# Patient Record
Sex: MALE | Race: WHITE | Employment: FULL TIME | URBAN - METROPOLITAN AREA
[De-identification: names, ages, dates, MRNs, and addresses within clinical notes are randomized per-mention and may not be internally consistent; named-entity substitution may affect disease eponyms.]

---

## 2017-09-25 ENCOUNTER — OFFICE VISIT (OUTPATIENT)
Dept: CARDIOLOGY CLINIC | Age: 37
End: 2017-09-25

## 2017-09-25 VITALS
HEIGHT: 73 IN | BODY MASS INDEX: 29.29 KG/M2 | HEART RATE: 72 BPM | SYSTOLIC BLOOD PRESSURE: 126 MMHG | DIASTOLIC BLOOD PRESSURE: 90 MMHG | WEIGHT: 221 LBS

## 2017-09-25 DIAGNOSIS — R00.2 PALPITATIONS: ICD-10-CM

## 2017-09-25 DIAGNOSIS — R94.31 ABNORMAL FINDING ON EKG: ICD-10-CM

## 2017-09-25 DIAGNOSIS — R07.9 CHEST PAIN AT REST: Primary | ICD-10-CM

## 2017-09-25 RX ORDER — ALPRAZOLAM 1 MG/1
TABLET ORAL
COMMUNITY

## 2017-09-25 NOTE — MR AVS SNAPSHOT
Visit Information Date & Time Provider Department Dept. Phone Encounter #  
 9/25/2017  1:40 PM Dian Joyner MD [facility] [phone] [id number] Your Appointments 10/3/2017  1:00 PM  
STRESS ECHOCARDIOGRAMS with SILVERIO ALLEN [facility] ([facility] SCHEDULING) Appt Note: stress echo dx: cp, abn ekg per Guadlupe Handy [address] [place]  
[address] [facility]  
  
    
 10/3/2017  1:00 PM  
STRESS ECHOCARDIOGRAMS with Dominick Muse [facility] ([facility] SCHEDULING) Appt Note: stress echo dx: cp, abn ekg per Guadlupe Handy [address] [place]  
[address] [id number] Upcoming Health Maintenance Date Due DTaP/Tdap/Td series (1 - Tdap) 10/6/2000 INFLUENZA AGE 9 TO ADULT 8/1/2017 Allergies as of 9/25/2017  Review Complete On: 9/25/2017 By: Dian Joyner MD  
 No Known Allergies Current Immunizations  Never Reviewed No immunizations on file. Not reviewed this visit You Were Diagnosed With   
  
 Codes Comments Chest pain at rest    -  Primary ICD-10-CM: R07.9 ICD-9-CM: 786.50 Palpitations     ICD-10-CM: R00.2 ICD-9-CM: 785.1 Abnormal finding on EKG     ICD-10-CM: R94.31 
ICD-9-CM: 794.31 Vitals BP Pulse Height(growth percentile) Weight(growth percentile) BMI Smoking Status 126/90 72 6' 1\" (1.854 m) 221 lb (100.2 kg) 29.16 kg/m2 Never Smoker Vitals History BMI and BSA Data Body Mass Index Body Surface Area  
 29.16 kg/m 2 2.27 m 2 Your Updated Medication List  
  
   
This list is accurate as of: 9/25/17  1:56 PM.  Always use your most recent med list.  
  
  
  
  
 cyclobenzaprine 10 mg tablet Commonly known as:  FLEXERIL  
 Take 1 Tab by mouth three (3) times daily as needed for Muscle Spasm(s). EFFEXOR  mg capsule Generic drug:  venlafaxine-SR Take 150 mg by mouth daily. LORazepam 0.5 mg tablet Commonly known as:  ATIVAN Take 0.5 mg by mouth. XANAX 1 mg tablet Generic drug:  ALPRAZolam  
Take  by mouth nightly as needed for Anxiety. We Performed the Following AMB POC EKG ROUTINE W/ 12 LEADS, INTER & REP [01984 CPT(R)] To-Do List   
 09/25/2017 ECHO:  ECHO TTE STRESS EXRCSE COMP W OR WO CONTR Introducing [facility]! Arely Morris introduces FastSpring patient portal. Now you can access parts of your medical record, email your doctor's office, and request medication refills online. 1. In your internet browser, go to [email or web address] 2. Click on the First Time User? Click Here link in the Sign In box. You will see the New Member Sign Up page. 3. Enter your FastSpring Access Code exactly as it appears below. You will not need to use this code after youve completed the sign-up process. If you do not sign up before the expiration date, you must request a new code. · FastSpring Access Code: [facility] Expires: 12/24/2017  1:41 PM 
 
4. Enter the last four digits of your Social Security Number (xxxx) and Date of Birth (mm/dd/yyyy) as indicated and click Submit. You will be taken to the next sign-up page. 5. Create a FastSpring ID. This will be your FastSpring login ID and cannot be changed, so think of one that is secure and easy to remember. 6. Create a FastSpring password. You can change your password at any time. 7. Enter your Password Reset Question and Answer. This can be used at a later time if you forget your password. 8. Enter your e-mail address. You will receive e-mail notification when new information is available in [address] 9. Click Sign Up. You can now view and download portions of your medical record. 10. Click the Download Summary menu link to download a portable copy of your medical information. If you have questions, please visit the Frequently Asked Questions section of the [employer] website. Remember, [employer] is NOT to be used for urgent needs. For medical emergencies, dial 911. Now available from your iPhone and Android! Please provide this summary of care documentation to your next provider. Your primary care clinician is listed as NONE. If you have any questions after today's visit, please call [phone].

## 2017-09-25 NOTE — PROGRESS NOTES
HISTORY OF PRESENT ILLNESS  Abram Olszewski is a 39 y.o. male. He is seen for evaluation of chest pains. He has been having left-sided chest pains for approximately one year. They radiate down his left arm. He has a family history of heart disease, including his father and grandfather, and therefore, he is concerned. The pains last only about ten seconds and do not stop him from doing whatever he is doing at the time. His cholesterol has been somewhat high in the past, but he is now eating better. He drinks alcohol on occasion, but does not smoke cigarettes. He works in information technology. He does try to exercise about twice per week and does not notice that the pain is definitely exertional.  His EKG shows some T wave inversions in the inferior leads and is read by the computer as possible prior anterior wall infarction due to poor R wave progression. HPI  Patient Active Problem List   Diagnosis Code    Palpitations R00.2    Chest pain at rest R07.9    Abnormal finding on EKG R94.31     Current Outpatient Prescriptions   Medication Sig Dispense Refill    ALPRAZolam (XANAX) 1 mg tablet Take  by mouth nightly as needed for Anxiety.  venlafaxine-SR (EFFEXOR XR) 150 mg capsule Take 150 mg by mouth daily.  LORazepam (ATIVAN) 0.5 mg tablet Take 0.5 mg by mouth.  cyclobenzaprine (FLEXERIL) 10 mg tablet Take 1 Tab by mouth three (3) times daily as needed for Muscle Spasm(s). 20 Tab 0     Past Medical History:   Diagnosis Date    Asthma      Past Surgical History:   Procedure Laterality Date    HX SEPTOPLASTY         Review of Systems   Cardiovascular: Positive for chest pain. All other systems reviewed and are negative. Visit Vitals    /90    Pulse 72    Ht 6' 1\" (1.854 m)    Wt 221 lb (100.2 kg)    BMI 29.16 kg/m2       Physical Exam   Constitutional: He is oriented to person, place, and time. He appears well-nourished. HENT:   Head: Atraumatic.    Eyes: Conjunctivae are normal.   Neck: Neck supple. Cardiovascular: Normal rate, regular rhythm and normal heart sounds. Exam reveals no gallop and no friction rub. No murmur heard. Pulmonary/Chest: Breath sounds normal. He has no wheezes. Abdominal: Bowel sounds are normal.   Musculoskeletal: He exhibits no edema. Neurological: He is oriented to person, place, and time. Skin: Skin is dry. Nursing note and vitals reviewed. ASSESSMENT and PLAN  His chest pain is somewhat atypical, but he is very worried about it. His EKG is not normal.  He does not smoke or have diabetes. I believe that he should undergo stress testing coupled with echocardiography to make sure that he does not have ischemia. We will arrange for this to be done in the office and call him regarding the results.

## 2017-12-08 ENCOUNTER — TELEPHONE (OUTPATIENT)
Dept: CARDIOLOGY CLINIC | Age: 37
End: 2017-12-08

## 2017-12-08 NOTE — TELEPHONE ENCOUNTER
Have not received order. Appointment will need to be made for patient to see Dr. Murali Dumont in the office. Called office # below and gave the nurse a message that appointment would need to be made to see Dr. Murali Dumont.

## 2017-12-08 NOTE — TELEPHONE ENCOUNTER
radha with dr Avila Cuff office called to schedule a tilt table test. Will fax over order   Phone: [phone]

## 2018-01-09 ENCOUNTER — HOSPITAL ENCOUNTER (EMERGENCY)
Age: 38
Discharge: HOME | End: 2018-01-09
Attending: EMERGENCY MEDICINE
Payer: SELF-PAY

## 2018-01-09 VITALS
OXYGEN SATURATION: 96 % | TEMPERATURE: 97.9 F | HEART RATE: 94 BPM | SYSTOLIC BLOOD PRESSURE: 133 MMHG | RESPIRATION RATE: 18 BRPM | BODY MASS INDEX: 26.37 KG/M2 | DIASTOLIC BLOOD PRESSURE: 97 MMHG | HEIGHT: 73 IN | WEIGHT: 199 LBS

## 2018-01-09 DIAGNOSIS — G90.A POSTURAL ORTHOSTATIC TACHYCARDIA SYNDROME: Primary | ICD-10-CM

## 2018-01-09 DIAGNOSIS — R79.89 ELEVATED TSH: ICD-10-CM

## 2018-01-09 DIAGNOSIS — K59.00 CONSTIPATION, UNSPECIFIED CONSTIPATION TYPE: ICD-10-CM

## 2018-01-09 LAB
ALBUMIN SERPL-MCNC: 4.6 G/DL (ref 3.5–5)
ALBUMIN/GLOB SERPL: 1.4 {RATIO} (ref 1.1–2.2)
ALP SERPL-CCNC: 74 U/L (ref 45–117)
ALT SERPL-CCNC: 41 U/L (ref 12–78)
ANION GAP SERPL CALC-SCNC: 12 MMOL/L (ref 5–15)
AST SERPL-CCNC: 16 U/L (ref 15–37)
ATRIAL RATE: 83 BPM
BASOPHILS # BLD: 0 K/UL (ref 0–0.1)
BASOPHILS NFR BLD: 0 % (ref 0–1)
BILIRUB SERPL-MCNC: 0.5 MG/DL (ref 0.2–1)
BUN SERPL-MCNC: 13 MG/DL (ref 6–20)
BUN/CREAT SERPL: 11 (ref 12–20)
CALCIUM SERPL-MCNC: 10.2 MG/DL (ref 8.5–10.1)
CALCULATED P AXIS, ECG09: 19 DEGREES
CALCULATED R AXIS, ECG10: -23 DEGREES
CALCULATED T AXIS, ECG11: 4 DEGREES
CHLORIDE SERPL-SCNC: 105 MMOL/L (ref 97–108)
CO2 SERPL-SCNC: 26 MMOL/L (ref 21–32)
CREAT SERPL-MCNC: 1.14 MG/DL (ref 0.7–1.3)
DIAGNOSIS, 93000: NORMAL
EOSINOPHIL # BLD: 0.1 K/UL (ref 0–0.4)
EOSINOPHIL NFR BLD: 1 % (ref 0–7)
ERYTHROCYTE [DISTWIDTH] IN BLOOD BY AUTOMATED COUNT: 12.1 % (ref 11.5–14.5)
GLOBULIN SER CALC-MCNC: 3.3 G/DL (ref 2–4)
GLUCOSE SERPL-MCNC: 106 MG/DL (ref 65–100)
HCT VFR BLD AUTO: 43.8 % (ref 36.6–50.3)
HGB BLD-MCNC: 15.7 G/DL (ref 12.1–17)
LYMPHOCYTES # BLD: 1.5 K/UL (ref 0.8–3.5)
LYMPHOCYTES NFR BLD: 15 % (ref 12–49)
MCH RBC QN AUTO: 29.8 PG (ref 26–34)
MCHC RBC AUTO-ENTMCNC: 35.8 G/DL (ref 30–36.5)
MCV RBC AUTO: 83.3 FL (ref 80–99)
MONOCYTES # BLD: 0.7 K/UL (ref 0–1)
MONOCYTES NFR BLD: 7 % (ref 5–13)
NEUTS SEG # BLD: 7.7 K/UL (ref 1.8–8)
NEUTS SEG NFR BLD: 77 % (ref 32–75)
P-R INTERVAL, ECG05: 128 MS
PLATELET # BLD AUTO: 297 K/UL (ref 150–400)
POTASSIUM SERPL-SCNC: 3.8 MMOL/L (ref 3.5–5.1)
PROT SERPL-MCNC: 7.9 G/DL (ref 6.4–8.2)
Q-T INTERVAL, ECG07: 382 MS
QRS DURATION, ECG06: 98 MS
QTC CALCULATION (BEZET), ECG08: 448 MS
RBC # BLD AUTO: 5.26 M/UL (ref 4.1–5.7)
SODIUM SERPL-SCNC: 143 MMOL/L (ref 136–145)
TROPONIN I SERPL-MCNC: <0.04 NG/ML
TSH SERPL DL<=0.05 MIU/L-ACNC: 3.35 UIU/ML (ref 0.36–3.74)
VENTRICULAR RATE, ECG03: 83 BPM
WBC # BLD AUTO: 9.9 K/UL (ref 4.1–11.1)

## 2018-01-09 PROCEDURE — 74011250636 HC RX REV CODE- 250/636: Performed by: EMERGENCY MEDICINE

## 2018-01-09 PROCEDURE — 36415 COLL VENOUS BLD VENIPUNCTURE: CPT | Performed by: EMERGENCY MEDICINE

## 2018-01-09 PROCEDURE — 84484 ASSAY OF TROPONIN QUANT: CPT | Performed by: EMERGENCY MEDICINE

## 2018-01-09 PROCEDURE — 99284 EMERGENCY DEPT VISIT MOD MDM: CPT

## 2018-01-09 PROCEDURE — 93005 ELECTROCARDIOGRAM TRACING: CPT

## 2018-01-09 PROCEDURE — 80053 COMPREHEN METABOLIC PANEL: CPT | Performed by: EMERGENCY MEDICINE

## 2018-01-09 PROCEDURE — 96361 HYDRATE IV INFUSION ADD-ON: CPT

## 2018-01-09 PROCEDURE — 84443 ASSAY THYROID STIM HORMONE: CPT | Performed by: EMERGENCY MEDICINE

## 2018-01-09 PROCEDURE — 96360 HYDRATION IV INFUSION INIT: CPT

## 2018-01-09 PROCEDURE — 74011250636 HC RX REV CODE- 250/636: Performed by: PHYSICIAN ASSISTANT

## 2018-01-09 PROCEDURE — 85025 COMPLETE CBC W/AUTO DIFF WBC: CPT | Performed by: EMERGENCY MEDICINE

## 2018-01-09 RX ADMIN — SODIUM CHLORIDE 1000 ML: 9 INJECTION, SOLUTION INTRAVENOUS at 01:00

## 2018-01-09 RX ADMIN — SODIUM CHLORIDE 1000 ML: 900 INJECTION, SOLUTION INTRAVENOUS at 03:00

## 2018-01-09 RX ADMIN — SODIUM CHLORIDE 1000 ML: 9 INJECTION, SOLUTION INTRAVENOUS at 01:33

## 2018-01-09 NOTE — DISCHARGE INSTRUCTIONS
Constipation: Care Instructions  Your Care Instructions    Constipation means that you have a hard time passing stools (bowel movements). People pass stools from 3 times a day to once every 3 days. What is normal for you may be different. Constipation may occur with pain in the rectum and cramping. The pain may get worse when you try to pass stools. Sometimes there are small amounts of bright red blood on toilet paper or the surface of stools. This is because of enlarged veins near the rectum (hemorrhoids). A few changes in your diet and lifestyle may help you avoid ongoing constipation. Your doctor may also prescribe medicine to help loosen your stool. Some medicines can cause constipation. These include pain medicines and antidepressants. Tell your doctor about all the medicines you take. Your doctor may want to make a medicine change to ease your symptoms. Follow-up care is a key part of your treatment and safety. Be sure to make and go to all appointments, and call your doctor if you are having problems. It's also a good idea to know your test results and keep a list of the medicines you take. How can you care for yourself at home? · Drink plenty of fluids, enough so that your urine is light yellow or clear like water. If you have kidney, heart, or liver disease and have to limit fluids, talk with your doctor before you increase the amount of fluids you drink. · Include high-fiber foods in your diet each day. These include fruits, vegetables, beans, and whole grains. · Get at least 30 minutes of exercise on most days of the week. Walking is a good choice. You also may want to do other activities, such as running, swimming, cycling, or playing tennis or team sports. · Take a fiber supplement, such as Citrucel or Metamucil, every day. Read and follow all instructions on the label. · Schedule time each day for a bowel movement. A daily routine may help.  Take your time having your bowel movement. · Support your feet with a small step stool when you sit on the toilet. This helps flex your hips and places your pelvis in a squatting position. · Your doctor may recommend an over-the-counter laxative to relieve your constipation. Examples are Milk of Magnesia and MiraLax. Read and follow all instructions on the label. Do not use laxatives on a long-term basis. When should you call for help? Call your doctor now or seek immediate medical care if:  ? · You have new or worse belly pain. ? · You have new or worse nausea or vomiting. ? · You have blood in your stools. ? Watch closely for changes in your health, and be sure to contact your doctor if:  ? · Your constipation is getting worse. ? · You do not get better as expected. Where can you learn more? Go to http://ileana-diomedes.info/. Enter 21 [phone] in the search box to learn more about \"Constipation: Care Instructions. \"  Current as of: March 20, 2017  Content Version: 11.4  © 5159-1841 [employer]. Care instructions adapted under license by [employer] (which disclaims liability or warranty for this information). If you have questions about a medical condition or this instruction, always ask your healthcare professional. [address] any warranty or liability for your use of this information. Orthostatic Hypotension: Care Instructions  Your Care Instructions    Orthostatic hypotension is a quick drop in blood pressure. It happens when you get up from sitting or lying down. You may feel faint, lightheaded, or dizzy. When a person sits up or stands up, the body changes the way it pumps blood. This can slow the flow of blood to the brain for a very short time. And that can make you feel lightheaded. Many medicines can cause this problem, especially in older people. Lack of fluids (dehydration) or illnesses such as diabetes or heart disease also can cause it.   Follow-up care is a key part of your treatment and safety. Be sure to make and go to all appointments, and call your doctor if you are having problems. It's also a good idea to know your test results and keep a list of the medicines you take. How can you care for yourself at home? · Tell your doctor about any problems you have with your medicines. · If your doctor prescribes medicine to help prevent a low blood pressure problem, take it exactly as prescribed. Call your doctor if you think you are having a problem with your medicine. · Drink plenty of fluids, enough so that your urine is light yellow or clear like water. Choose water and other caffeine-free clear liquids. If you have kidney, heart, or liver disease and have to limit fluids, talk with your doctor before you increase the amount of fluids you drink. · Limit or avoid alcohol and caffeine. · Get up slowly from bed or after sitting for a long time. If you are in bed, roll to your side and swing your legs over the edge of the bed and onto the floor. Push your body up to a sitting position. Wait for a while before you slowly stand up. If you are dizzy or lightheaded, sit or lie down. When should you call for help? Call 911 anytime you think you may need emergency care. For example, call if:  ? · You passed out (lost consciousness). ? Watch closely for changes in your health, and be sure to contact your doctor if:  ? · You do not get better as expected. Where can you learn more? Go to http://ileana-diomedes.info/. Enter U349 in the search box to learn more about \"Orthostatic Hypotension: Care Instructions. \"  Current as of: September 21, 2016  Content Version: 11.4  © 5964-0877 [employer]. Care instructions adapted under license by [employer] (which disclaims liability or warranty for this information).  If you have questions about a medical condition or this instruction, always ask your healthcare professional. [employer] disclaims any warranty or liability for your use of this information. We hope that we have addressed all of your medical concerns. The examination and treatment you received in the Emergency Department were for an emergent problem and were not intended as complete care. It is important that you follow up with your healthcare provider(s) for ongoing care. If your symptoms worsen or do not improve as expected, and you are unable to reach your usual health care provider(s), you should return to the Emergency Department. Today's healthcare is undergoing tremendous change, and patient satisfaction surveys are one of the many tools to assess the quality of medical care. You may receive a survey from the [employer] regarding your experience in the Emergency Department. I hope that your experience has been completely positive, particularly the medical care that I provided. As such, please participate in the survey; anything less than excellent does not meet my expectations or intentions. [facility] and [address] [place] [address] participate in nationally recognized quality of care measures. If your blood pressure is greater than 120/80, as reported below, we urge that you seek medical care to address the potential of high blood pressure, commonly known as hypertension. Hypertension can be hereditary or can be caused by certain medical conditions, pain, stress, or \"white coat syndrome. \"       Please make an appointment with your health care provider(s) for follow up of your Emergency Department visit. VITALS:   Patient Vitals for the past 8 hrs:   Temp Pulse Resp BP SpO2   01/09/18 0146 - - - 131/76 -   01/09/18 0145 - - - - 98 %   01/09/18 0034 97.9 °F (36.6 °C) 94 18 (!) 127/98 96 %          Thank you for allowing us to provide you with medical care today. We realize that you have many choices for your emergency care needs. Please choose us in the future for any continued health care needs. Ruslan Hill, [address]   Office: [phone]            Recent Results (from the past 24 hour(s))   CBC WITH AUTOMATED DIFF    Collection Time: 01/09/18 12:49 AM   Result Value Ref Range    WBC 9.9 4.1 - 11.1 K/uL    RBC 5.26 4.10 - 5.70 M/uL    HGB 15.7 12.1 - 17.0 g/dL    HCT 43.8 36.6 - 50.3 %    MCV 83.3 80.0 - 99.0 FL    MCH 29.8 26.0 - 34.0 PG    MCHC 35.8 30.0 - 36.5 g/dL    RDW 12.1 11.5 - 14.5 %    PLATELET 053 187 - 175 K/uL    NEUTROPHILS 77 (H) 32 - 75 %    LYMPHOCYTES 15 12 - 49 %    MONOCYTES 7 5 - 13 %    EOSINOPHILS 1 0 - 7 %    BASOPHILS 0 0 - 1 %    ABS. NEUTROPHILS 7.7 1.8 - 8.0 K/UL    ABS. LYMPHOCYTES 1.5 0.8 - 3.5 K/UL    ABS. MONOCYTES 0.7 0.0 - 1.0 K/UL    ABS. EOSINOPHILS 0.1 0.0 - 0.4 K/UL    ABS. BASOPHILS 0.0 0.0 - 0.1 K/UL   METABOLIC PANEL, COMPREHENSIVE    Collection Time: 01/09/18 12:49 AM   Result Value Ref Range    Sodium 143 136 - 145 mmol/L    Potassium 3.8 3.5 - 5.1 mmol/L    Chloride 105 97 - 108 mmol/L    CO2 26 21 - 32 mmol/L    Anion gap 12 5 - 15 mmol/L    Glucose 106 (H) 65 - 100 mg/dL    BUN 13 6 - 20 MG/DL    Creatinine 1.14 0.70 - 1.30 MG/DL    BUN/Creatinine ratio 11 (L) 12 - 20      GFR est AA >60 >60 ml/min/1.73m2    GFR est non-AA >60 >60 ml/min/1.73m2    Calcium 10.2 (H) 8.5 - 10.1 MG/DL    Bilirubin, total 0.5 0.2 - 1.0 MG/DL    ALT (SGPT) 41 12 - 78 U/L    AST (SGOT) 16 15 - 37 U/L    Alk. phosphatase 74 45 - 117 U/L    Protein, total 7.9 6.4 - 8.2 g/dL    Albumin 4.6 3.5 - 5.0 g/dL    Globulin 3.3 2.0 - 4.0 g/dL    A-G Ratio 1.4 1.1 - 2.2     TROPONIN I    Collection Time: 01/09/18 12:49 AM   Result Value Ref Range    Troponin-I, Qt. <0.04 <0.05 ng/mL       No results found.

## 2018-01-09 NOTE — ED PROVIDER NOTES
HPI Comments: Tiffanie Grier is a 44 y.o. male  who presents by private vehicle to ER with c/o Patient presents with:  Dizziness  Patient presents with complaints of palpitations that started tonight while patient was trying to have a bowel movement. Patient currently being worked up for possible POTS. Patient reports near syncopal episode tonight, was unable to stand. Patient called EMS, was taken to [facility] ED. Patient ubered here due to wait times. Patient reports symptoms resolved at this time. Patient reports persistent constipation. Patient also seen at [facility] Ed last night and had Ct scan of abdomen that did not show an obstruction per patient. He specifically denies any fevers, chills, vomiting,  headache, rash, diarrhea, abdominal pain, urinary/bowel changes, sweating or weight loss. PCP: None   PMHx significant for: Past Medical History:  No date: Asthma   PSHx significant for: Past Surgical History:  No date: HX SEPTOPLASTY  Social Hx: Tobacco use: Smoking status: Never Smoker                                                              Smokeless status: Never Used                      ; EtOH use: The patient states he drinks occasionally per week.; Illicit Drug use: Allergies:  No Known Allergies    There are no other complaints, changes or physical findings at this time. Patient is a 44 y.o. male presenting with dizziness. The history is provided by the patient. Dizziness   This is a new problem. The problem has not changed since onset. There was no focality noted. Pertinent negatives include no focal weakness, no loss of sensation, no loss of balance, no slurred speech, no speech difficulty, no memory loss, no movement disorder, no agitation, no visual change, no auditory change, no mental status change, no unresponsiveness and no disorientation. There has been no fever. Associated symptoms include nausea.  Pertinent negatives include no shortness of breath, no chest pain, no vomiting, no altered mental status, no confusion, no headaches, no choking, no bowel incontinence and no bladder incontinence. There were no medications administered prior to arrival.        Past Medical History:   Diagnosis Date    Asthma        Past Surgical History:   Procedure Laterality Date    HX SEPTOPLASTY           No family history on file. Social History     Social History    Marital status:      Spouse name: N/A    Number of children: N/A    Years of education: N/A     Occupational History    Not on file. Social History Main Topics    Smoking status: Never Smoker    Smokeless tobacco: Never Used    Alcohol use Yes    Drug use: No    Sexual activity: Yes     Partners: Female     Other Topics Concern    Not on file     Social History Narrative         ALLERGIES: Review of patient's allergies indicates no known allergies. Review of Systems   Constitutional: Negative. HENT: Negative. Eyes: Negative. Respiratory: Negative. Negative for choking and shortness of breath. Cardiovascular: Positive for palpitations. Negative for chest pain. Gastrointestinal: Positive for nausea. Negative for bowel incontinence and vomiting. Endocrine: Negative. Genitourinary: Negative. Negative for bladder incontinence. Musculoskeletal: Negative. Skin: Negative. Allergic/Immunologic: Negative. Neurological: Positive for dizziness. Negative for focal weakness, speech difficulty, headaches and loss of balance. Hematological: Negative. Psychiatric/Behavioral: Negative. Negative for agitation, confusion and memory loss. All other systems reviewed and are negative. Vitals:    01/09/18 0034   BP: (!) 127/98   Pulse: 94   Resp: 18   Temp: 97.9 °F (36.6 °C)   SpO2: 96%   Weight: 90.3 kg (199 lb)   Height: 6' 1\" (1.854 m)            Physical Exam   Constitutional: He is oriented to person, place, and time. He appears well-developed and well-nourished.    HENT:   Head: Normocephalic and atraumatic. Right Ear: External ear normal.   Left Ear: External ear normal.   Mouth/Throat: Oropharynx is clear and moist. No oropharyngeal exudate. Eyes: Conjunctivae and EOM are normal. Pupils are equal, round, and reactive to light. Right eye exhibits no discharge. Left eye exhibits no discharge. No scleral icterus. Neck: Normal range of motion. Neck supple. No tracheal deviation present. No thyromegaly present. Cardiovascular: Normal rate, regular rhythm, normal heart sounds and intact distal pulses. No murmur heard. Pulmonary/Chest: Effort normal and breath sounds normal. No respiratory distress. He has no wheezes. He has no rales. Abdominal: Soft. Bowel sounds are normal. He exhibits no distension. There is no tenderness. There is no rebound and no guarding. Musculoskeletal: Normal range of motion. He exhibits no edema or tenderness. Lymphadenopathy:     He has no cervical adenopathy. Neurological: He is alert and oriented to person, place, and time. No cranial nerve deficit. Coordination normal.   Skin: Skin is warm. No rash noted. No erythema. Psychiatric: His behavior is normal. Judgment and thought content normal. His mood appears anxious. Nursing note and vitals reviewed. MDM  Number of Diagnoses or Management Options  Diagnosis management comments: Assesment/Plan- 44 y.o. Patient presents with:  Dizziness  differential includes: POTs, constipation, orthostatic tachycardia. Labs  reviewed with no acute findings. Patient requesting enema in ED for constipation. Will attempt, patient getting 2L IV fluids, palpitations improved. Recommend GI, cardiology and PCP follow up. Patient educated on reasons to return to the ED.          Amount and/or Complexity of Data Reviewed  Clinical lab tests: ordered and reviewed  Tests in the medicine section of CPT®: ordered and reviewed  Discuss the patient with other providers: yes (Attending- Dr. Mike Conte)      ED Course Procedures

## 2018-01-09 NOTE — ED TRIAGE NOTES
Feeling of heart racing worse upon standing, being worked up by the primary care for [employer] being seen because unable to stand up due to feeling heart rate go up and room closing in. The episode started tonight while trying to have a bowel movement. Pt also complaining of mild constipation.  Was seen at [facility] but \"the wait was too long so I took an Uber here\"

## 2018-01-09 NOTE — ED NOTES
ED EKG interpretation:  Rhythm: normal sinus rhythm; and regular . Rate (approx.): 83; Axis: normal; P wave: normal; QRS interval: normal ; ST/T wave: normal;This EKG was interpreted by Ayah Mueller MD,ED Provider.

## 2018-01-10 ENCOUNTER — TELEPHONE (OUTPATIENT)
Dept: CARDIOLOGY CLINIC | Age: 38
End: 2018-01-10

## 2018-01-10 RX ORDER — MIRTAZAPINE 15 MG/1
7.5 TABLET, FILM COATED ORAL
Status: ON HOLD | COMMUNITY
End: 2018-01-24

## 2018-01-10 NOTE — TELEPHONE ENCOUNTER
Pt stated that he can not walk and requested someone meet him tomorrow with a wheelchair and help him get up to his appt. Pt was in ER yesterday for dizziness. Pt will call when he is about 5 minutes away so someone can come meet him.     Thank you,  Marilia Giron

## 2018-01-10 NOTE — TELEPHONE ENCOUNTER
Patient called stating that he has recently started taking Remeron for anxiety and it seems to be \"masking\" his HR/BP, he would like to discuss prior to appt. Please call him at [phone].  Thank you

## 2018-01-10 NOTE — TELEPHONE ENCOUNTER
Verified patient with two types of identifiers. Patient states that he has started taking Remeron 7.5 mg daily at bedtime. Patient states that today he has been having a \"good\" day and hasn't been having as many bad episodes. Patient wanted to know if he should stop taking his medication for tomorrow's appointment as to not \"mask\" his symptoms he's been feeling. Notified patient to continue taking his medication as prescribed. Notified patient he can discuss and describe his episodes with MD at appointment tomorrow. Patient verbalized understanding and will call with any other questions.

## 2018-01-11 ENCOUNTER — OFFICE VISIT (OUTPATIENT)
Dept: CARDIOLOGY CLINIC | Age: 38
End: 2018-01-11

## 2018-01-11 VITALS
HEART RATE: 102 BPM | DIASTOLIC BLOOD PRESSURE: 92 MMHG | BODY MASS INDEX: 26.37 KG/M2 | HEIGHT: 73 IN | WEIGHT: 199 LBS | SYSTOLIC BLOOD PRESSURE: 122 MMHG

## 2018-01-11 DIAGNOSIS — R00.2 PALPITATIONS: ICD-10-CM

## 2018-01-11 DIAGNOSIS — R42 DIZZINESS: Primary | ICD-10-CM

## 2018-01-11 RX ORDER — NEBIVOLOL 5 MG/1
5 TABLET ORAL DAILY
Qty: 28 TAB | Refills: 0 | Status: SHIPPED | COMMUNITY
Start: 2018-01-11 | End: 2018-01-11 | Stop reason: SDUPTHER

## 2018-01-11 RX ORDER — NEBIVOLOL 5 MG/1
5 TABLET ORAL DAILY
Qty: 90 TAB | Refills: 1 | Status: SHIPPED | OUTPATIENT
Start: 2018-01-11 | End: 2018-01-15 | Stop reason: SDUPTHER

## 2018-01-11 RX ORDER — SODIUM CHLORIDE 0.9 % (FLUSH) 0.9 %
5-10 SYRINGE (ML) INJECTION EVERY 8 HOURS
Status: CANCELLED | OUTPATIENT
Start: 2018-01-11

## 2018-01-11 RX ORDER — BISMUTH SUBSALICYLATE 262 MG
1 TABLET,CHEWABLE ORAL DAILY
COMMUNITY
End: 2018-02-08

## 2018-01-11 RX ORDER — SODIUM CHLORIDE 0.9 % (FLUSH) 0.9 %
5-10 SYRINGE (ML) INJECTION AS NEEDED
Status: CANCELLED | OUTPATIENT
Start: 2018-01-11

## 2018-01-11 NOTE — PROGRESS NOTES
Cardiac Electrophysiology Office Note     Subjective:      Abigail West is a 44 y.o. patient who is seen for evaluation of  Dizziness and palpitation  He said he cannot work and has no energy  When he got up his HR went up right away  He has gone to ER many times for this  He had seen Dr Isa Mohan at [facility] but is not any more and one time was recommended to try toprol  His mother had MI at younger than 47 yo and no clear hx of sudden death in family    Patient Active Problem List   Diagnosis Code    Palpitations R00.2    Chest pain at rest R07.9    Abnormal finding on EKG R94.31     Current Outpatient Prescriptions   Medication Sig Dispense Refill    multivitamin (ONE A DAY) tablet Take 1 Tab by mouth daily.  nebivolol (BYSTOLIC) 5 mg tablet Take 1 Tab by mouth daily. 90 Tab 1    mirtazapine (REMERON) 15 mg tablet Take 7.5 mg by mouth nightly.  ALPRAZolam (XANAX) 1 mg tablet Take  by mouth nightly as needed for Anxiety.  LORazepam (ATIVAN) 0.5 mg tablet Take 0.5 mg by mouth as needed. Allergies   Allergen Reactions    Latex Rash     Past Medical History:   Diagnosis Date    Asthma      Past Surgical History:   Procedure Laterality Date    HX SEPTOPLASTY         Social History   Substance Use Topics    Smoking status: Never Smoker    Smokeless tobacco: Never Used    Alcohol use Yes        Review of Systems:   Constitutional: Negative for fever, chills, weight loss, + malaise/fatigue. HEENT: Negative for nosebleeds, vision changes. Respiratory: Negative for cough, hemoptysis  Cardiovascular: Negative for chest pain, + palpitations, orthopnea, claudication, leg swelling, syncope, and PND. Gastrointestinal: Negative for nausea, vomiting, diarrhea, blood in stool and melena. Genitourinary: Negative for dysuria, and hematuria. Musculoskeletal: Negative for myalgias, arthralgia. Skin: Negative for rash. Heme: Does not bleed or bruise easily.    Neurological: Negative for speech change and focal weakness     Objective:     Visit Vitals    BP (!) 122/92 (BP 1 Location: Left arm, BP Patient Position: Sitting)    Pulse (!) 102    Ht 6' 1\" (1.854 m)    Wt 199 lb (90.3 kg)    BMI 26.25 kg/m2      Physical Exam:   Constitutional: well-developed and well-nourished. No respiratory distress. Head: Normocephalic and atraumatic. Eyes: Pupils are equal, round  ENT: hearing normal  Neck: supple. No JVD present. Cardiovascular: Normal rate, regular rhythm. Exam reveals no gallop and no friction rub. No murmur heard. Pulmonary/Chest: Effort normal and breath sounds normal. No wheezes. Abdominal: Soft, no tenderness. Musculoskeletal: no edema. Neurological: alert, oriented. Skin: Skin is warm and dry  Psychiatric: normal mood and affect. Behavior is normal. Judgment and thought content normal.      EKG: normal EKG, normal sinus rhythm. Assessment/Plan:       ICD-10-CM ICD-9-CM    1. Dizziness R42 780.4    2. Palpitations R00.2 785.1      reviewed medications and side effects in detail   Will get echo and previous cardiac work ups from Dr Isa Mohan  I discussed and recommend tilt table test for POTS  He agrees but wants to try beta blocker, I gave 5 mg bystolic samples and compression stockings but need to hold before tilt table test    Thank you for involving me in this patient's care and please call with further concerns or questions. Miranda Solitario M.D.   Electrophysiology/Cardiology  [facility] [place]  [address], [address] [facility], [address]  [address]  [phone]

## 2018-01-11 NOTE — PATIENT INSTRUCTIONS
Start Bystolic 5mg every day and compression stockings. Your tilt table test has been scheduled for 1/17/18 at 12:00pm, at [facility].    Please report to Admitting Department by 10:00am, or 2 hours prior to your scheduled procedure. Please bring a list of your current medications and medication bottles, if able, to the hospital on this day. You will be unable to drive after your procedure so please make sure to bring someone with you to your procedure. You will need to have nothing to eat or drink after midnight, the night prior to your procedure. You may have small sips of water, if needed, to take with your medication. You should stop your medication, Bystolic and compression stockings, 2 days prior to your scheduled procedure.

## 2018-01-11 NOTE — LETTER
NOTIFICATION RETURN TO WORK  
 
1/11/2018 3:45 PM 
 
Mr. Harsha Delgado [address] [facility] [phone] To Whom It May Concern: 
 
Lemon Appl is currently [facility] the [facility]. He will return to work on: 1/18/18 If there are questions or concerns please have the patient contact our office.  
 
 
 
Sincerely, 
 
 
Vinnie Rinne, MD

## 2018-01-11 NOTE — MR AVS SNAPSHOT
Visit Information Date & Time Provider Department Dept. Phone Encounter #  
 1/11/2018  2:40 PM Roya Rosales MD [facility] [place] [phone] [id number] Follow-up Instructions Return in about 4 weeks (around 2/8/2018). Your Appointments 2/1/2018  3:00 PM  
New Patient with Rabia Patrick MD  
[address] [facility] ([address]) Appt Note: NP self refd for continued care of OSA_bring machine Dc [address] [address] [facility]  
  
   
 [address] [facility] [address] Upcoming Health Maintenance Date Due DTaP/Tdap/Td series (1 - Tdap) 10/6/2000 Influenza Age 5 to Adult 8/1/2017 Allergies as of 1/11/2018  Review Complete On: 1/11/2018 By: Roya Rosales MD  
  
 Severity Noted Reaction Type Reactions Latex  01/11/2018    Rash Current Immunizations  Never Reviewed No immunizations on file. Not reviewed this visit You Were Diagnosed With   
  
 Codes Comments Dizziness    -  Primary ICD-10-CM: L77 ICD-9-CM: 780.4 Palpitations     ICD-10-CM: R00.2 ICD-9-CM: 785.1 Vitals BP Pulse Height(growth percentile) Weight(growth percentile) BMI Smoking Status (!) 122/92 (BP 1 Location: Left arm, BP Patient Position: Sitting) (!) 102 6' 1\" (1.854 m) 199 lb (90.3 kg) 26.25 kg/m2 Never Smoker Vitals History BMI and BSA Data Body Mass Index Body Surface Area  
 26.25 kg/m 2 2.16 m 2 Preferred Pharmacy Pharmacy Name Phone  [address] [phone] Your Updated Medication List  
  
   
This list is accurate as of: 1/11/18  3:26 PM.  Always use your most recent med list.  
  
  
  
  
 LORazepam 0.5 mg tablet Commonly known as:  ATIVAN Take 0.5 mg by mouth as needed. multivitamin tablet Commonly known as:  ONE A DAY Take 1 Tab by mouth daily. nebivolol 5 mg tablet Commonly known as:  BYSTOLIC Take 1 Tab by mouth daily. REMERON 15 mg tablet Generic drug:  mirtazapine Take 7.5 mg by mouth nightly. XANAX 1 mg tablet Generic drug:  ALPRAZolam  
Take  by mouth nightly as needed for Anxiety. Prescriptions Sent to Pharmacy Refills  
 nebivolol (BYSTOLIC) 5 mg tablet 1 Sig: Take 1 Tab by mouth daily. Class: Normal  
 Pharmacy: [place] [address]  #: [phone] Route: Oral  
  
Follow-up Instructions Return in about 4 weeks (around 2/8/2018). Patient Instructions Start Bystolic 5mg every day and compression stockings. Your tilt table test has been scheduled for 1/17/18 at 12:00pm, at [facility] 
 
Please report to Admitting Department by 10:00am, or 2 hours prior to your scheduled procedure. Please bring a list of your current medications and medication bottles, if able, to the hospital on this day. You will be unable to drive after your procedure so please make sure to bring someone with you to your procedure. You will need to have nothing to eat or drink after midnight, the night prior to your procedure. You may have small sips of water, if needed, to take with your medication. You should stop your medication, Bystolic and compression stockings, 2 days prior to your scheduled procedure. Introducing [facility]! Dear Amari Kirkpatrick: 
Thank you for requesting a [facility] account. Our records indicate that you already have an active [facility] account. You can access your account anytime at [email or web address]. [employer] Did you know that you can access your hospital and ER discharge instructions at any time in [facility]? You can also review all of your test results from your hospital stay or ER visit. Additional Information If you have questions, please visit the Frequently Asked Questions section of the [employer] website at [email or web address]. "[employer]. [employer]/mychart/. Remember, [employer] is NOT to be used for urgent needs. For medical emergencies, dial 911. Now available from your iPhone and Android! Please provide this summary of care documentation to your next provider. Your primary care clinician is listed as NONE. If you have any questions after today's visit, please call [phone].

## 2018-01-12 ENCOUNTER — TELEPHONE (OUTPATIENT)
Dept: CARDIOLOGY CLINIC | Age: 38
End: 2018-01-12

## 2018-01-12 NOTE — TELEPHONE ENCOUNTER
Verified patient with two types of identifiers. Patient states that he noticed his heart rate was as low as 59 in the middle of the night. Notified patient that your heart rate naturally decreases at night when you sleep so that is expected. Patient concerned due to the new medication. Informed patient that medication can affect heart rate and BP; however, we would want to know if he has any associated symptoms. Patient asked if he should take the medication only if his symptoms re-occur. Notified patient to take medication as directed by MD. Also notified patient that MD requested records from Dr. Erika Triana. Asked patient to have records faxed to the office. Patient requests fax number be sent via [employer] message. Message sent. Patient verbalized understanding and will call with any other questions.

## 2018-01-12 NOTE — TELEPHONE ENCOUNTER
Patient called stating his pulse has been in the been in the low 60's. He is concerned about taking his medication, he thinks it will drop even lower. Please call him at [phone].  Thank you

## 2018-01-15 ENCOUNTER — TELEPHONE (OUTPATIENT)
Dept: CARDIOLOGY CLINIC | Age: 38
End: 2018-01-15

## 2018-01-15 RX ORDER — SODIUM CHLORIDE 0.9 % (FLUSH) 0.9 %
5-10 SYRINGE (ML) INJECTION AS NEEDED
Status: CANCELLED | OUTPATIENT
Start: 2018-01-15

## 2018-01-15 RX ORDER — SODIUM CHLORIDE 0.9 % (FLUSH) 0.9 %
5-10 SYRINGE (ML) INJECTION EVERY 8 HOURS
Status: CANCELLED | OUTPATIENT
Start: 2018-01-15

## 2018-01-15 NOTE — TELEPHONE ENCOUNTER
Verified patient with two types of identifiers. States that since starting Bystolic 5mg he has been c/o fatigue and feels that the 5mg every day is too much. Wants to know if he can take half of that. Also states that he is out of town and will need a script sent into the [facility] at [address] # [phone]. He would also like to move his procedure to the following week as he would like to spend some time with his family. Advised patient that I would not be able to give him a work note to extend the week and he understands this. He states that he will not need another work note. Moved procedure to 1/24/18 and prep given again. Will check with MD on bystolic.

## 2018-01-15 NOTE — TELEPHONE ENCOUNTER
Pt calling in regards to bystolic, he states that he was returning Chary's phone call. Please give him a call back @ [phone]. Thanks!   Juan M Mckenna

## 2018-01-15 NOTE — TELEPHONE ENCOUNTER
Pt calling in regards to his procedure on Wednesday and he also has questions about a medication. Please give him a call back @ [phone]. Thanks!   Nida Gilbert

## 2018-01-15 NOTE — TELEPHONE ENCOUNTER
Continue with bystolic and compression stockings until tilt table test as instructed before in clinic

## 2018-01-16 ENCOUNTER — TELEPHONE (OUTPATIENT)
Dept: CARDIOLOGY CLINIC | Age: 38
End: 2018-01-16

## 2018-01-16 RX ORDER — NEBIVOLOL 5 MG/1
5 TABLET ORAL DAILY
Qty: 90 TAB | Refills: 1 | Status: SHIPPED | OUTPATIENT
Start: 2018-01-16 | End: 2018-02-08

## 2018-01-16 NOTE — TELEPHONE ENCOUNTER
From: Favian Camacho  To: Amber Gonzalez MD  Sent: 1/15/2018 7:20 PM EST  Subject: Medication Renewal Request    Original authorizing provider: MD Favian Jay would like a refill of the following medications:  nebivolol (BYSTOLIC) 5 mg tablet Amber Gonzalez MD]    Preferred pharmacy: [facility] - [address] [facility], [address] [place] [address]    Comment:  I did not actually get a prescription for this. I went to spend some (well needed) time with my family now that I'm at least slightly mobile I need to get this to a local pharmacy where I am ( [address] [facility], [address] [place] [address] Street) as I am three hours away (six round-trip) and did not realize that each bottle was only seven. I only have two left with me I left a message today.  I'm also asking if I should try the 2.5's instead of the 5mg's because of how tired this makes me feel

## 2018-01-16 NOTE — TELEPHONE ENCOUNTER
Verified patient with two types of identifiers. States that he went to go  his bystolic from the pharmacy and they would not fill it due to another pharmacy filling it. Advise the patient that the script was sent to mail order and they are filling it but the pharmacy will call to get an override due to the patient being out of town and needing it. Pharmacy advised that the patient will need this soon. Patient verbalizes understanding. And will call with any questions or concerns.

## 2018-01-24 ENCOUNTER — TELEPHONE (OUTPATIENT)
Dept: CARDIOLOGY CLINIC | Age: 38
End: 2018-01-24

## 2018-01-24 ENCOUNTER — HOSPITAL ENCOUNTER (OUTPATIENT)
Dept: NON INVASIVE DIAGNOSTICS | Age: 38
Discharge: HOME | End: 2018-01-24
Attending: INTERNAL MEDICINE | Admitting: INTERNAL MEDICINE
Payer: COMMERCIAL

## 2018-01-24 VITALS
HEIGHT: 73 IN | OXYGEN SATURATION: 98 % | SYSTOLIC BLOOD PRESSURE: 121 MMHG | BODY MASS INDEX: 25.98 KG/M2 | WEIGHT: 196 LBS | TEMPERATURE: 98.4 F | RESPIRATION RATE: 23 BRPM | HEART RATE: 79 BPM | DIASTOLIC BLOOD PRESSURE: 83 MMHG

## 2018-01-24 PROBLEM — R55 SYNCOPE AND COLLAPSE: Status: ACTIVE | Noted: 2018-01-24

## 2018-01-24 PROCEDURE — 93660 TILT TABLE EVALUATION: CPT

## 2018-01-24 RX ORDER — ATROPINE SULFATE 0.1 MG/ML
1 INJECTION INTRAVENOUS AS NEEDED
Status: DISCONTINUED | OUTPATIENT
Start: 2018-01-24 | End: 2018-01-24 | Stop reason: ALTCHOICE

## 2018-01-24 RX ORDER — SODIUM CHLORIDE 0.9 % (FLUSH) 0.9 %
10 SYRINGE (ML) INJECTION AS NEEDED
Status: DISCONTINUED | OUTPATIENT
Start: 2018-01-24 | End: 2018-01-24 | Stop reason: ALTCHOICE

## 2018-01-24 RX ORDER — NITROGLYCERIN 0.4 MG/1
0.4 TABLET SUBLINGUAL AS NEEDED
Status: DISCONTINUED | OUTPATIENT
Start: 2018-01-24 | End: 2018-01-24 | Stop reason: ALTCHOICE

## 2018-01-24 NOTE — DISCHARGE INSTRUCTIONS
Wear compression stockings  May try 1/2 dose bystolic first  Future Appointments  Date Time Provider Neil Slaughter   2/1/2018 3:00 PM Shay Griggs MD [facility] [employer]

## 2018-01-24 NOTE — TELEPHONE ENCOUNTER
Spoke to Dr Julio Doherty and patient is currently in recovery as of right now post tilt table test.  No changes at this time, continue current medications. Keep follow up appt. Left message for patient to contact us back if anything else needed.

## 2018-01-24 NOTE — PROGRESS NOTES
Cardiac Cath Lab Recovery Arrival Note:      [facility] arrived to Cardiac Cath Lab, Recovery Area. Staff introduced to patient. Patient identifiers verified with NAME and DATE OF BIRTH. Procedure verified with patient. Consent forms reviewed and signed by patient or authorized representative and verified. Allergies verified. Patient informed of procedure and plan of care. Questions answered with review. Patient prepped for procedure, per orders from physician, prior to arrival.    Patient on cardiac monitor, non-invasive blood pressure, SPO2 monitor. Patient is A&Ox 4. Patient reports no complaints. Patient in stretcher, in low position, with side rails up, call bell within reach, patient instructed to call of assistance as needed. Patient prep in: [facility] Recovery Area, Bed# 7. Family in: waiting room. Prep by: M. Darris Gilford

## 2018-01-24 NOTE — PROCEDURES
DATE of PROCEDURE:  1/24/2018    Head-up tilt-table test    HISTORY:  This is a 44 y. o.man with a history of dizziness and palpitation and he said he could not work. He had seen Dr Salinas Gonzalez before and was recommended beta blocker but he did not take. When I saw him in the office I scheduled the tilt table test and he said he could not wait for long and should be treated. I gave him bystolic sample and compression stockings in the office and today he had stopped bystolic before the test and has never used compression stockings. The patient said he has felt good the past few days. The patient was explained the risks and benefits of the head-up tilt-table test and he agreed to proceed. PROCEDURE IN DETAIL:  After informed written consent was obtained, the patient was brought to the Electrophysiology Suite where he was laid on the tilt table. The patient was strapped down for his safety and he had the baseline blood pressure of 125/85, heart rate of 67 beats per minute. The patient was then tilted up to 70 degrees. During the baseline tilt test he felt palpitation but had sinus rate to maximum 102 bpm when he was anxious and said to me and my nurses in the procedure it was \"white coat syndrome\" and I told him I was wearing blue scrub. His final blood pressure was 111/77 mmHg and sinus rate was 73 bpm.  He was not agreeable to sublingual nitroglycerin when I explained to him the side effect of the medication and possible drop in blood pressure and reflex sinus tachycardia  Specimen removed: NONE  Complication: none  ASSESSMENT AND PLAN:  The patient had a negative tilt table test.  He insisted on the POTS diagnosis which I told him I cannot confirm on this tilt table test.  He told me he felt good today and this is why the test is negative  I explained to him that bystolic seems to help him and he only agrees to resume 1/2 dose of what I gave him. I recommended him start to use compression stockings.   He did not have a prescheduled appointment for follow up so I told my office nurse to contact him after it is scheduled

## 2018-01-24 NOTE — PROGRESS NOTES
At the conclusion of the tilt table test with the witness of nurses present in the tilt test, I had explained to the patient the result of the test and he insisted on POTS which I cannot confirm. He was smiling and said he will use 1/2 dose bystolic (I had given him before the procedure and he said he has it at home) and he will start using compression stockings that he had ordered  I offered appointment for follow up and also to talk to his friend Ms Roberth Dudley that he listed the phone numbers and he said he would talk to her  Therefore I discharged the patient and moved on to the next procedure  During my next procedure, I was informed that he was not pleased and started to call my office, patient's advocate and wants the diagnosis of POTS to be written down. I cannot certify that.

## 2018-01-24 NOTE — TELEPHONE ENCOUNTER
Pt called concerned because he was at the hospital for his tilt table test and he said Dr. Pernell Manzano asked him a few questions and then he was told that he wasn't having the test done. Pt states he is extremely confused and wants to know what's going on because he's missed a lot of work and he doesn't even have a diagnosis. Please give him a call back @ [phone]. Thanks!   Hali Pak

## 2018-01-24 NOTE — TELEPHONE ENCOUNTER
I am not familiar with [facility] area but there is Dr Jocelyn Martinez with [facility] at [facility]

## 2018-01-24 NOTE — TELEPHONE ENCOUNTER
Pt calling to see what time he needs to arrive for his tilt table test this afternoon. Please give him a call back @ [phone]. Thanks!   Jorge Orellana

## 2018-01-24 NOTE — TELEPHONE ENCOUNTER
Attempted to reach patient by telephone. A message was left for return call.  Patient needs to arrive at [facility] by 10:00 am this morning for his 12:00 pm Tilt table test.

## 2018-01-24 NOTE — IP AVS SNAPSHOT
Summary of Care Report The Summary of Care report has been created to help improve care coordination. Users with access to [employer] or [address] (Web-based application) may access additional patient information including the Discharge Summary. If you are not currently a [address] user and need more information, please call the number listed below in the [place] [address] section and ask to be connected with Medical Records. Facility Information Name Address Phone [address] [address] [phone] Patient Information Patient Name Sex  Elias Encarnacion ([id number]) Male 1980 Discharge Information Admitting Provider Service Area Unit Dior Quinonez MD / [phone] [address] [facility] Electrophysiology / [phone] Discharge Provider Discharge Date/Time Discharge Disposition Destination (none) 2018 11:25 (Pending) AHR (none) Patient Language Language ENGLISH [13] Hospital Problems as of 2018  Reviewed: 2018 10:02 AM by Dior Quinonez MD  
  
  
  
 Class Noted - Resolved Last Modified POA Active Problems * (Principal)Syncope and collapse  2018 - Present 2018 by Dior Quinonez MD Unknown Entered by Dior Quinonez MD  
  
Non-Hospital Problems as of 2018  Reviewed: 2018 10:02 AM by Dior Quinonez MD  
  
  
  
 Class Noted - Resolved Last Modified Active Problems Palpitations  2017 - Present 2017 by Kane Lockett LPN Entered by Kane Lockett LPN Chest pain at rest  2017 - Present 2017 by Kim Millan MD  
  Entered by Kim Millan MD  
  Abnormal finding on EKG  2017 - Present 2017 by Kim Millan MD  
  Entered by Kim Millan MD  
  
You are allergic to the following Allergen Reactions Latex Rash Current Discharge Medication List  
  
CONTINUE these medications which have NOT CHANGED Dose & Instructions Dispensing Information Comments LORazepam 0.5 mg tablet Commonly known as:  ATIVAN Dose:  0.5 mg Take 0.5 mg by mouth as needed. Refills:  0  
   
 multivitamin tablet Commonly known as:  ONE A DAY Dose:  1 Tab Take 1 Tab by mouth daily. Refills:  0  
   
 nebivolol 5 mg tablet Commonly known as:  BYSTOLIC Dose:  5 mg Take 1 Tab by mouth daily. Quantity:  90 Tab Refills:  1 XANAX 1 mg tablet Generic drug:  ALPRAZolam  
 Take  by mouth nightly as needed for Anxiety. Refills:  0 Follow-up Information None Discharge Instructions Wear compression stockings May try 1/2 dose bystolic first 
Future Appointments Date Time Provider Neil Slaughter 2/1/2018 3:00 PM [facility] [employer] Chart Review Routing History No Routing History on File

## 2018-01-24 NOTE — INTERVAL H&P NOTE
H&P Update:  Shan Cunningham was seen and examined. History and physical has been reviewed. The patient has been examined.  There have been no significant clinical changes since the completion of the originally dated History and Physical.    Signed By: Melvin Mckeon MD     January 24, 2018 10:02 AM

## 2018-01-24 NOTE — TELEPHONE ENCOUNTER
Verified patient with two types of identifiers. Patient calling to state he needs a sooner follow up for tilt table test. Notified patient he is scheduled for next availability. Patient requesting call from MD regarding procedure today. Patient also requesting an EP recommendation for the [place] location.  Will notify MD.

## 2018-01-24 NOTE — PROGRESS NOTES
TRANSFER - IN REPORT:    Verbal report received from Michellizeth Christiansen on Tiffanie Grier , from the Cardiac Cath lab, for routine progression of care. Report consisted of patients Situation, Background, Assessment and Recommendations(SBAR). Information from the following report(s) Procedure Summary, Intake/Output, MAR and Recent Results was reviewed with the receiving clinician. Opportunity for questions and clarification was provided. Assessment completed upon patients arrival to [address] [place] [address] and care assumed. Cardiac Cath Lab Recovery Arrival Note:     Tiffanie Grier arrived to [facility] recovery area. Patient procedure= Tilt table study. Patient on cardiac monitor, non-invasive blood pressure, Patient status doing well without problems. Patient is A&Ox 4. Patient reports no complaints. Procedure site without any bleeding and no hematoma.

## 2018-01-24 NOTE — H&P (VIEW-ONLY)
Cardiac Electrophysiology Office Note     Subjective:      Vinnie Donnelly is a 44 y.o. patient who is seen for evaluation of  Dizziness and palpitation  He said he cannot work and has no energy  When he got up his HR went up right away  He has gone to ER many times for this  He had seen Dr Carolyn Cho at [place] but is not any more and one time was recommended to try toprol  His mother had MI at younger than 49 yo and no clear hx of sudden death in family    Patient Active Problem List   Diagnosis Code    Palpitations R00.2    Chest pain at rest R07.9    Abnormal finding on EKG R94.31     Current Outpatient Prescriptions   Medication Sig Dispense Refill    multivitamin (ONE A DAY) tablet Take 1 Tab by mouth daily.  nebivolol (BYSTOLIC) 5 mg tablet Take 1 Tab by mouth daily. 90 Tab 1    mirtazapine (REMERON) 15 mg tablet Take 7.5 mg by mouth nightly.  ALPRAZolam (XANAX) 1 mg tablet Take  by mouth nightly as needed for Anxiety.  LORazepam (ATIVAN) 0.5 mg tablet Take 0.5 mg by mouth as needed. Allergies   Allergen Reactions    Latex Rash     Past Medical History:   Diagnosis Date    Asthma      Past Surgical History:   Procedure Laterality Date    HX SEPTOPLASTY         Social History   Substance Use Topics    Smoking status: Never Smoker    Smokeless tobacco: Never Used    Alcohol use Yes        Review of Systems:   Constitutional: Negative for fever, chills, weight loss, + malaise/fatigue. HEENT: Negative for nosebleeds, vision changes. Respiratory: Negative for cough, hemoptysis  Cardiovascular: Negative for chest pain, + palpitations, orthopnea, claudication, leg swelling, syncope, and PND. Gastrointestinal: Negative for nausea, vomiting, diarrhea, blood in stool and melena. Genitourinary: Negative for dysuria, and hematuria. Musculoskeletal: Negative for myalgias, arthralgia. Skin: Negative for rash. Heme: Does not bleed or bruise easily.    Neurological: Negative for speech change and focal weakness     Objective:     Visit Vitals    BP (!) 122/92 (BP 1 Location: Left arm, BP Patient Position: Sitting)    Pulse (!) 102    Ht 6' 1\" (1.854 m)    Wt 199 lb (90.3 kg)    BMI 26.25 kg/m2      Physical Exam:   Constitutional: well-developed and well-nourished. No respiratory distress. Head: Normocephalic and atraumatic. Eyes: Pupils are equal, round  ENT: hearing normal  Neck: supple. No JVD present. Cardiovascular: Normal rate, regular rhythm. Exam reveals no gallop and no friction rub. No murmur heard. Pulmonary/Chest: Effort normal and breath sounds normal. No wheezes. Abdominal: Soft, no tenderness. Musculoskeletal: no edema. Neurological: alert, oriented. Skin: Skin is warm and dry  Psychiatric: normal mood and affect. Behavior is normal. Judgment and thought content normal.      EKG: normal EKG, normal sinus rhythm. Assessment/Plan:       ICD-10-CM ICD-9-CM    1. Dizziness R42 780.4    2. Palpitations R00.2 785.1      reviewed medications and side effects in detail   Will get echo and previous cardiac work ups from Dr Erika Triana  I discussed and recommend tilt table test for POTS  He agrees but wants to try beta blocker, I gave 5 mg bystolic samples and compression stockings but need to hold before tilt table test    Thank you for involving me in this patient's care and please call with further concerns or questions. Nadiya Russ M.D.   Electrophysiology/Cardiology  [facility] [place]  [address], [address] [facility], [address]  [phone]

## 2018-01-24 NOTE — PROGRESS NOTES
1150:  I have reviewed discharge instructions with the patient. The patient verbalized understanding. The patient verbalized that he is upset because he doesn't feel like he knows what the next step is. The patient expresses concern about losing his job because he doesn't have a diagnosis of POTS. Dr. Pradip Villavicencio is in another procedure, but the patient doesn't want to wait for Dr. Pradip Villavicencio to be out of the procedure to have his questions answered at this time. Dr. Pradip Villavicencio made aware that the patient wants to know the diagnosis and what the next step is. Dr. Pradip Villavicencio states that his nurse will call the patient to set up a follow up. This information relayed to the patient.

## 2018-01-24 NOTE — IP AVS SNAPSHOT
[address] 
[phone] Patient: Deysi Harrison MRN: [id number] :1980 About your hospitalization You were admitted on:  2018 You last received care in the:  [facility] ELECTROPHYSIOLOGY You were discharged on:  2018 Why you were hospitalized Your primary diagnosis was:  Syncope And Collapse Follow-up Information None Your Scheduled Appointments 2018 12:00 PM EST  
EP 75 with CATH ROOM 3 [facility] ELECTROPHYSIOLOGY ([address]) [address]  
[phone] OP Registration: CHANDRA Gallegos 78 Telephone: [phone] Fax: [phone] Send ALL pt scheduled for Radiology, CT, US, Nutrition, Vascular, Cardiology, EEG, Nuc Med, Pulmonary, and Stress to this location ** Pt will need to arrive 30 min prior unless appointment prep instructions indicate otherwise**   3:00 PM EST New Patient with Rhoda Alvarez MD  
[address] [facility] ([address]) [address] [id number] [phone] Discharge Orders None A check daria indicates which time of day the medication should be taken. My Medications CONTINUE taking these medications Instructions Each Dose to Equal  
 Morning Noon Evening Bedtime LORazepam 0.5 mg tablet Commonly known as:  ATIVAN Your last dose was: Your next dose is: Take 0.5 mg by mouth as needed. 0.5 mg  
    
   
   
   
  
 multivitamin tablet Commonly known as:  ONE A DAY Your last dose was: Your next dose is: Take 1 Tab by mouth daily. 1 Tab  
    
   
   
   
  
 nebivolol 5 mg tablet Commonly known as:  BYSTOLIC Your last dose was: Your next dose is: Take 1 Tab by mouth daily. 5 mg XANAX 1 mg tablet Generic drug:  ALPRAZolam  
   
Your last dose was: Your next dose is: Take  by mouth nightly as needed for Anxiety. Discharge Instructions Wear compression stockings May try 1/2 dose bystolic first 
Future Appointments Date Time Provider Neil Slaughter 2/1/2018 3:00 PM Anam Cutler MD [facility] [address] Introducing [facility]! Dear Candace Singh: 
Thank you for requesting a Maptia account. Our records indicate that you already have an active Maptia account. You can access your account anytime at [email or web address] Did you know that you can access your hospital and ER discharge instructions at any time in Maptia? You can also review all of your test results from your hospital stay or ER visit. Additional Information If you have questions, please visit the Frequently Asked Questions section of the Maptia website at [email or web address] Remember, Maptia is NOT to be used for urgent needs. For medical emergencies, dial 911. Now available from your iPhone and Android! Providers Seen During Your Hospitalization Provider Specialty Primary office phone Dior Quinonez MD Cardiology [phone] Your Primary Care Physician (PCP) Primary Care Physician Office Phone Office Fax NONE ** None ** ** None ** You are allergic to the following Allergen Reactions Latex Rash Recent Documentation Height Weight BMI Smoking Status 1.854 m 88.9 kg 25.86 kg/m2 Never Smoker Emergency Contacts Name Discharge Info Relation Home Work Mobile Kia Perkins DISCHARGE CAREGIVER [3] Friend [5] [phone] Patient Belongings The following personal items are in your possession at time of discharge: Visual Aid: Glasses (reading glasses.) Please provide this summary of care documentation to your next provider. Signatures-by signing, you are acknowledging that this After Visit Summary has been reviewed with you and you have received a copy. Patient Signature:  ____________________________________________________________ Date:  ____________________________________________________________  
  
Janace Chica Provider Signature:  ____________________________________________________________ Date:  ____________________________________________________________

## 2018-01-24 NOTE — IP AVS SNAPSHOT
1111 [facility] [address] [place] [address] 
[phone] Patient: Queta Vang MRN: [id number] :1980 A check daria indicates which time of day the medication should be taken. My Medications CONTINUE taking these medications Instructions Each Dose to Equal  
 Morning Noon Evening Bedtime LORazepam 0.5 mg tablet Commonly known as:  ATIVAN Your last dose was: Your next dose is: Take 0.5 mg by mouth as needed. 0.5 mg  
    
   
   
   
  
 multivitamin tablet Commonly known as:  ONE A DAY Your last dose was: Your next dose is: Take 1 Tab by mouth daily. 1 Tab  
    
   
   
   
  
 nebivolol 5 mg tablet Commonly known as:  BYSTOLIC Your last dose was: Your next dose is: Take 1 Tab by mouth daily. 5 mg XANAX 1 mg tablet Generic drug:  ALPRAZolam  
   
Your last dose was: Your next dose is: Take  by mouth nightly as needed for Anxiety.

## 2018-01-24 NOTE — PROGRESS NOTES
Cardiac Cath Lab Procedure Area Arrival Note:    Edd Conroy arrived to Cardiac Cath Lab, Procedure Area. Patient identifiers verified with NAME and DATE OF BIRTH. Procedure verified with patient. Consent forms verified. Allergies verified. Patient informed of procedure and plan of care. Questions answered with review. Patient voiced understanding of procedure and plan of care. Patient on cardiac monitor, non-invasive blood pressure, SPO2 monitor. On RA. IV of NS on pump at 25 ml/hr. Patient status doing well without problems. Patient is A&Ox 4. Patient reports no pain. Patient medicated during procedure with orders obtained and verified by Dr. Satnam Leal. Refer to patients Cardiac Cath Lab PROCEDURE REPORT for vital signs, assessment, status, and response during procedure, printed at end of case. Printed report on chart or scanned into chart. TRANSFER - OUT REPORT:    Verbal report given to [address] on Edd Conroy being transferred to cath lab recovery for routine progression of care       Report consisted of patients Situation, Background, Assessment and   Recommendations(SBAR). Information from the following report(s) Procedure Summary was reviewed with the receiving nurse. Opportunity for questions and clarification was provided.

## 2018-01-25 ENCOUNTER — TELEPHONE (OUTPATIENT)
Dept: CARDIOLOGY CLINIC | Age: 38
End: 2018-01-25

## 2018-01-25 ENCOUNTER — DOCUMENTATION ONLY (OUTPATIENT)
Dept: CARDIOLOGY CLINIC | Age: 38
End: 2018-01-25

## 2018-01-25 NOTE — TELEPHONE ENCOUNTER
----- Message from Chandler Fleming RN sent at 1/25/2018 10:30 AM EST -----  Regarding: FW:EP recommendation  Contact: [phone]      ----- Message -----     From: Shanae Rosario     Sent: 1/25/2018  10:20 AM       To: Nat Vargas  Subject: RE:EP recommendation                             The dr can  the phone  and talk to me. I jusr spent thousands of dollars  on a test, and i would like to discuss. Please and thank you    If he refuses, please put me in contact  with  a supervisor  ----- Message -----  From: Christina Gomez LPN  Sent: 4/22/4765 10:13 AM EST  To: Shanae Rosario  Subject: RE:EP recommendation    Dr Pradip Villavicencio stated that your tilt table test was negative and that he can discuss any further issues at your appointment. Thanks     ----- Message -----     From: Shanae Rosaroi     Sent: 1/24/2018  6:02 PM EST       To: Sara Russ RN  Subject: RE:EP recommendation    Also please keep in mind that its way worse when simple  walking or lifting  something  small. .. like pillows!!!  ----- Message -----  From: Sara Russ RN  Sent: 1/24/2018  4:59 PM EST  To: Shanae Rosario  Subject: RE:EP recommendation    Good Evening  Deb Mckeon,     Dr. Pradip Villavicencio said your test was negative. Thank You!     ----- Message -----     From: Shanae Rosario     Sent: 1/24/2018  4:29 PM EST       To: Sara Russ RN  Subject: RE:EP recommendation    Thank you    I look forward to my diagnosis ASAP, as the rest of my medical care team is waiting for this to be able to help me further     My symptoms as documented  very clearly  are in line with POTS  ----- Message -----  From: Sara Russ RN  Sent: 1/24/2018  4:14 PM EST  To: Shanae Rosario  Subject: EP recommendation    Good Afternoon Mr. Deb Mckeon,     Dr. Pradip Villavicencio recommends Dr. Melissa Kramer with [facility] at [facility].     [address]!

## 2018-01-25 NOTE — PROGRESS NOTES
I spoke to him on the phone today   He continued to claim that he did not remember conversation I had with him in hospital and he could not remember I talked to him about result and medication, stockings  He did not like me to talk about these things with the nurses present but they all witnessed that I had discussed  He kept disagreeing the result of the tilt test and I told him I cannot put down diagnosis the way he wants and if he does not want to follow my recommendation he can seek the third opinion  He already wanted [address] [place] [address] and I gave Dr Karl Bhardwaj name  He can also go to [facility]  My nurse Karsten Rodrigues was present with me when I talked to him

## 2018-02-08 ENCOUNTER — OFFICE VISIT (OUTPATIENT)
Dept: CARDIOLOGY CLINIC | Age: 38
End: 2018-02-08

## 2018-02-08 VITALS
DIASTOLIC BLOOD PRESSURE: 72 MMHG | BODY MASS INDEX: 26.51 KG/M2 | HEIGHT: 73 IN | WEIGHT: 200 LBS | HEART RATE: 68 BPM | SYSTOLIC BLOOD PRESSURE: 118 MMHG

## 2018-02-08 DIAGNOSIS — R42 DIZZINESS: ICD-10-CM

## 2018-02-08 DIAGNOSIS — R00.2 PALPITATIONS: Primary | ICD-10-CM

## 2018-02-08 RX ORDER — NEBIVOLOL 2.5 MG/1
2.5 TABLET ORAL DAILY
COMMUNITY

## 2018-02-08 NOTE — MR AVS SNAPSHOT
[address] [address] [facility] 
[phone] Patient: Harsha Delgado MRN: [id number] :1980 Visit Information Date & Time Provider Department Dept. Phone Encounter #  
 2018 10:20 AM Vinnie Rinne, MD [facility] Duncan Fleming [phone] [id number] Follow-up Instructions Return in about 6 months (around 2018), or if symptoms worsen or fail to improve. Upcoming Health Maintenance Date Due DTaP/Tdap/Td series (1 - Tdap) 10/6/2000 Influenza Age 5 to Adult 2017 Allergies as of 2018  Review Complete On: 2018 By: Vinnie Rinne, MD  
  
 Severity Noted Reaction Type Reactions Latex  2018    Rash Current Immunizations  Never Reviewed No immunizations on file. Not reviewed this visit You Were Diagnosed With   
  
 Codes Comments Palpitations    -  Primary ICD-10-CM: R00.2 ICD-9-CM: 785.1 Dizziness     ICD-10-CM: K68 ICD-9-CM: 780.4 Vitals BP Pulse Height(growth percentile) Weight(growth percentile) BMI Smoking Status 118/72 (BP 1 Location: Left arm, BP Patient Position: Sitting) 68 6' 1\" (1.854 m) 200 lb (90.7 kg) 26.39 kg/m2 Never Smoker Vitals History BMI and BSA Data Body Mass Index Body Surface Area  
 26.39 kg/m 2 2.16 m 2 Preferred Pharmacy Pharmacy Name Phone [facility] [phone] Your Updated Medication List  
  
   
This list is accurate as of: 18 11:18 AM.  Always use your most recent med list.  
  
  
  
  
 BYSTOLIC 2.5 mg tablet Generic drug:  nebivolol Take 2.5 mg by mouth daily. LORazepam 0.5 mg tablet Commonly known as:  ATIVAN Take 0.5 mg by mouth as needed. XANAX 1 mg tablet Generic drug:  ALPRAZolam  
Take  by mouth nightly as needed for Anxiety. Follow-up Instructions Return in about 6 months (around 8/8/2018), or if symptoms worsen or fail to improve. Patient Instructions You will need to follow up in clinic with Dr. Yesika Dover as needed. Introducing [facility] & [facility]! Dear Gareth Garcia: 
Thank you for requesting a Slingbox account. Our records indicate that you already have an active Slingbox account. You can access your account anytime at [email or web address] [email or web address] Did you know that you can access your hospital and ER discharge instructions at any time in Slingbox? You can also review all of your test results from your hospital stay or ER visit. Additional Information If you have questions, please visit the Frequently Asked Questions section of the Slingbox website at [email or web address] Remember, Slingbox is NOT to be used for urgent needs. For medical emergencies, dial 911. Now available from your iPhone and Android! Please provide this summary of care documentation to your next provider. Your primary care clinician is listed as NONE. If you have any questions after today's visit, please call [phone].

## 2018-02-08 NOTE — PROGRESS NOTES
Cardiac Electrophysiology Office Note     Subjective:      Brenda Glaser is a 44 y.o. patient who had been seen for evaluation of Dizziness and palpitation  He said he cannot work and has no energy  When he got up his HR went up right away  He has gone to ER many times for this  He had seen Dr Brian Resendez at [address] [facility] but is not any more and one time was recommended to try toprol  His mother had MI at younger than 47 yo and no clear hx of sudden death in family  During that visit he tried 5 mg bystolic and felt better to the tilt test day  His tilt test was negative but he was hoping for POTS  We agreed at that time he will use 2.5 mg bystolic  He said he feels better with dizziness but still has mild sinus tach 110 bpm and sometimes he feels erectile dysfunction and asthma    Patient Active Problem List   Diagnosis Code    Palpitations R00.2    Chest pain at rest R07.9    Abnormal finding on EKG R94.31    Syncope and collapse R55     Current Outpatient Prescriptions   Medication Sig Dispense Refill    nebivolol (BYSTOLIC) 2.5 mg tablet Take 2.5 mg by mouth daily.  ALPRAZolam (XANAX) 1 mg tablet Take  by mouth nightly as needed for Anxiety.  LORazepam (ATIVAN) 0.5 mg tablet Take 0.5 mg by mouth as needed.  nebivolol (BYSTOLIC) 5 mg tablet Take 1 Tab by mouth daily. 90 Tab 1    multivitamin (ONE A DAY) tablet Take 1 Tab by mouth daily. Allergies   Allergen Reactions    Latex Rash     Past Medical History:   Diagnosis Date    Asthma      Past Surgical History:   Procedure Laterality Date    HX SEPTOPLASTY         Social History   Substance Use Topics    Smoking status: Never Smoker    Smokeless tobacco: Never Used    Alcohol use Yes        Review of Systems:   Constitutional: Negative for fever, chills, weight loss   HEENT: Negative for nosebleeds, vision changes.    Respiratory: Negative for cough, hemoptysis  Cardiovascular: Negative for chest pain, + palpitations, orthopnea, claudication, leg swelling, syncope, and PND. Gastrointestinal: Negative for nausea, vomiting, diarrhea, blood in stool and melena. Genitourinary: Negative for dysuria, and hematuria. Musculoskeletal: Negative for myalgias, arthralgia. Skin: Negative for rash. Heme: Does not bleed or bruise easily. Neurological: Negative for speech change and focal weakness     Objective:     Visit Vitals    /72 (BP 1 Location: Left arm, BP Patient Position: Sitting)    Pulse 68    Ht 6' 1\" (1.854 m)    Wt 200 lb (90.7 kg)    BMI 26.39 kg/m2      Physical Exam:   Constitutional: well-developed and well-nourished. No respiratory distress. Head: Normocephalic and atraumatic. Eyes: Pupils are equal, round  ENT: hearing normal  Neck: supple. No JVD present. Cardiovascular: Normal rate, regular rhythm. Exam reveals no gallop and no friction rub. No murmur heard. Pulmonary/Chest: Effort normal and breath sounds normal. No wheezes. Abdominal: Soft, no tenderness. Musculoskeletal: no edema. Neurological: alert, oriented. Skin: Skin is warm and dry  Psychiatric: normal mood and affect. Behavior is normal. Judgment and thought content normal.           Assessment/Plan:       ICD-10-CM ICD-9-CM    1. Palpitations R00.2 785.1    2. Dizziness R42 780.4      reviewed medications and side effects in detail   Will get echo and previous cardiac work ups from Dr Al Kuo  I discussed tilt table test results again and he is more comfortable now he said but wants to know long term side effect of beta blocker  I used bystolic for neurally mediated syncope/near syncope  I think ED and mild asthma will improve  I recommend exercise regularly but keep hydrated  He is not wearing compression stockings today  He has quit his stressful job and moves to [employer] [place].   I gave him Dr Rosalee Grier as EP in [facility]    Follow up PRN   Thank you for involving me in this patient's care and please call with further concerns or questions. Gladis Alcantara M.D.   Electrophysiology/Cardiology  [facility] and [facility] [place]  [address], [address] [facility], [facility] 91  [address] [facility], [address] [place] [address]  [phone]

## 2018-02-08 NOTE — PROGRESS NOTES
Chief Complaint   Patient presents with    Dizziness    Follow-up     from tilt table test     Verified patient with two types of identifiers. Verified medications with the patient. Verified pharmacy with patient.

## 2018-02-08 NOTE — PROGRESS NOTES
Per Dr Tera Phillips discontinued all medications not taken. Faxed authorization to release to [facility] at fax number [phone]. Fax confirmation received.